# Patient Record
Sex: MALE | Race: WHITE | Employment: FULL TIME | URBAN - METROPOLITAN AREA
[De-identification: names, ages, dates, MRNs, and addresses within clinical notes are randomized per-mention and may not be internally consistent; named-entity substitution may affect disease eponyms.]

---

## 2019-04-24 ENCOUNTER — HOSPITAL ENCOUNTER (OUTPATIENT)
Dept: GENERAL RADIOLOGY | Age: 64
Discharge: HOME | End: 2019-04-24
Payer: COMMERCIAL

## 2019-04-24 DIAGNOSIS — M79.644 PAIN IN FINGER OF RIGHT HAND: ICD-10-CM

## 2019-04-24 DIAGNOSIS — M25.512 LEFT SHOULDER PAIN: ICD-10-CM

## 2019-04-24 DIAGNOSIS — M25.511 SHOULDER PAIN, RIGHT: ICD-10-CM

## 2019-04-24 PROCEDURE — 73030 X-RAY EXAM OF SHOULDER: CPT

## 2019-04-24 PROCEDURE — 73130 X-RAY EXAM OF HAND: CPT

## 2022-10-07 ENCOUNTER — OFFICE VISIT (OUTPATIENT)
Dept: NEUROLOGY | Age: 67
End: 2022-10-07
Payer: COMMERCIAL

## 2022-10-07 VITALS
OXYGEN SATURATION: 98 % | WEIGHT: 273 LBS | SYSTOLIC BLOOD PRESSURE: 130 MMHG | DIASTOLIC BLOOD PRESSURE: 78 MMHG | HEART RATE: 67 BPM

## 2022-10-07 DIAGNOSIS — M48.062 LUMBAR STENOSIS WITH NEUROGENIC CLAUDICATION: Primary | ICD-10-CM

## 2022-10-07 DIAGNOSIS — G50.0 TRIGEMINAL NEURALGIA OF LEFT SIDE OF FACE: ICD-10-CM

## 2022-10-07 PROCEDURE — 99205 OFFICE O/P NEW HI 60 MIN: CPT | Performed by: NURSE PRACTITIONER

## 2022-10-07 PROCEDURE — 1123F ACP DISCUSS/DSCN MKR DOCD: CPT | Performed by: NURSE PRACTITIONER

## 2022-10-07 NOTE — PROGRESS NOTES
Pt said HA have pretty much stopped  Cathin Look they come and go  Had a tooth extracted and made HA worse, pains in cheek and right above left eye, sinus area  Currently having them every now and then, couldn't verify how often    Sciatica nerve pain in right hip, knee, and down to shin, aching pains only when sitting   Pains are everyday  Standing makes pain go away

## 2022-10-10 NOTE — PROGRESS NOTES
Kirstin Bradford is a 69 y.o. male who presents with the following  Chief Complaint   Patient presents with    New Patient     HA and sciatica nerve pain       HPI    New patient for headaches and sciatica  His headaches started a few months ago after a tooth extraction on the left side  He discusses distribution on V2 V3 of the trigeminal nerve  The pain comes and goes in the head and face  Sharp, spasms  These are almost gone  He rarely has the symptoms anymore  This is what he made his original appointment for but he does have right leg weakness was paresthesia  He does have a history of sciatica and noticing his right leg is worse the last couple weeks  No falls tripping or trauma  He does have low back pain which radiates down the back of the hip down the back of it but and down the back of the leg  The pain can be intense and he has to sit or stand depending upon what he is doing  The more he is active the more it hurts  Nothing is really helped  He has not done physical therapy  He wants to hold off on any kind of meds for right now  It does bother him    Not on File    No current outpatient medications on file. No current facility-administered medications for this visit. Social History     Tobacco Use   Smoking Status Not on file   Smokeless Tobacco Not on file       No past medical history on file. No past surgical history on file. No family history on file. Social History     Socioeconomic History    Marital status:        ROS    Remainder of comprehensive review is negative. Physical Exam :    Visit Vitals  /78 (BP 1 Location: Left upper arm, BP Patient Position: Sitting, BP Cuff Size: Large adult)   Pulse 67   Wt 123.8 kg (273 lb)   SpO2 98%       General: Well defined, nourished, and groomed individual in no acute distress. Neck: Supple, nontender, no bruits, no pain with resistance to active range of motion.     Musculoskeletal: Extremities revealed no edema and had full range of motion of joints. Psych: Good mood and bright affect    NEUROLOGICAL EXAMINATION:    Mental Status: Alert and oriented to person, place, and time    Cranial Nerves:    II, III, IV, VI: Visual acuity grossly intact. Visual fields are normal.    Pupils are equal, round, and reactive to light and accommodation. Extra-ocular movements are full and fluid. Fundoscopic exam was benign, no ptosis or nystagmus. V-XII: Hearing is grossly intact. Facial features are symmetric, with normal sensation and strength. The palate rises symmetrically and the tongue protrudes midline. Sternocleidomastoids 5/5. Motor Examination: Normal tone, bulk, and strength, 5/5 muscle strength throughout. 3/5 RLE     Coordination: Finger to nose was normal. No resting or intention tremor    Gait and Station: Steady while walking. Normal arm swing. No pronator drift. No muscle wasting or fasiculations noted. Reflexes: DTRs 4+ throughout RLE, 2+ LLE     Neurosensory Exam:  Stocking glove sensory loss to temperature, vibration, and pinprick to the thighs. No results found for this or any previous visit. Orders Placed This Encounter    REFERRAL TO PHYSICAL THERAPY     Referral Priority:   Routine     Referral Type:   PT/OT/ST     Referral Reason:   Specialty Services Required     Number of Visits Requested:   1    EMG LIMITED     Standing Status:   Future     Standing Expiration Date:   4/7/2023     Order Specific Question:   Reason for Exam:     Answer:   right leg weakness, paresthesia       1. Lumbar stenosis with neurogenic claudication    2.  Trigeminal neuralgia of left side of face          Trigeminal neuralgia of the left side of the face post tooth work  Pretty much gone so we will continue to watch and wait    EMG of the right leg to look at sciatica causes  PT for the low back and leg to help with symptoms  Follow-up after            This note will [facility] be viewable in [facility]

## 2022-11-01 ENCOUNTER — OFFICE VISIT (OUTPATIENT)
Dept: NEUROLOGY | Age: 67
End: 2022-11-01

## 2022-11-01 DIAGNOSIS — M54.17 LUMBOSACRAL RADICULOPATHY: Primary | ICD-10-CM

## 2022-11-01 NOTE — PROGRESS NOTES
This was an elective EMG and nerve conduction of the patient's right leg by request.  Concerns went to stereotypical radicular type symptoms down the right leg on the anterior lateral aspect of the right lower leg and just above the ankle to the lateral malleolus. Positional in nature feels worse sitting and better standing. Does have background diabetes. Per nurse practitioner exam had moderate weakness right leg only with stocking sensory loss to the thighs and exam otherwise unrevealing. EMG and nerve conduction findings. 1.  Needle insertion and probing was remarkable for nondescript irritability of the right medial gastrocs and the right biceps femoris lateral head and the right L4-5 L5-S1 paraspinals. Otherwise no confident pattern of fibs or fasics or positive sharp waves were discernible. Motor unit recruitment in all muscles was appropriate as to morphology amplitude and time sequencing. Patient tolerated the procedure without incident. 2.  The nerve conduction portion showed absent sensory's and there was an absent right peroneal motor to the EDB but the EDB was an atretic muscle. Slightly decreased nerve conduction velocity with right tibial motor to the abductor hallucis brevis. H reflex normal.    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 No doubt an expression of his diabetes. The irritability in the framework of the right medial gastrocs and the biceps femoris lateral head and paraspinals, is nonspecific and otherwise has no direct correlations to a confident denervation pattern. Clinical correlation is advised.   [address]

## 2022-11-01 NOTE — PROGRESS NOTES
EMG/ NCS Report  [facility]  - [facility]  [facility]  [facility] [address]   [address]   Ph: [phone]   FAX: [phone]/ [phone]  Test Date:  2019      Test Date:  2022    Patient: Dino Mendieta : 1955 Physician: Sudeep Schroeder MD   Sex: Male Height: ' \" Ref Hung [place]   ID#: [id number] Weight:  lbs. Technician: Scott Rooney     Patient History / Exam:  CC:RT. Leg pain,weakness. EMG & NCV Findings:  Evaluation of the right Fibular motor nerve showed no response (Ankle), no response (B Fib), and no response (Poplt). The right Fibular TA motor nerve showed normal distal onset latency (Fib Head, 3.5 ms), normal amplitude (4.3 mV), normal distal onset latency (Poplit, 4.8 ms), and normal conduction velocity (Poplit-Fib Head, 77 m/s). The right tibial motor nerve showed normal distal onset latency (5.4 ms), normal amplitude (1.4 mV), and decreased conduction velocity (Knee-Ankle, 33 m/s). The right Sup Fibular sensory nerve showed no response (Lower leg) and no response (Site 2). The right sural sensory nerve showed no response (Calf) and no response (Site 2). F Wave studies indicate that the right tibial F wave has prolonged latency (63.39 ms). Needle evaluation of the right medial gastrocnemius muscle showed increased insertional activity. All remaining muscles (as indicated in the following table) showed no evidence of electrical instability.         Impression:        ___________________________  Juan M Cabrera IV, MD      Nerve Conduction Studies  Anti Sensory Summary Table     Stim Site NR Peak (ms) Norm Peak (ms) P-T Amp (µV) Norm P-T Amp Site1 Site2 Dist (cm)   Right Sup Fibular Anti Sensory (Lat ankle)  32.3 °C   Lower leg NR  <4.6  >4 Lower leg Lat ankle 10.0   Site 2 NR          Right Sural Anti Sensory (Lat Mall)  32.3 °C   Calf NR  <4.5  >4.0 Calf Lat Mall 14.0   Site 2 NR            Motor Summary Table     Stim Site NR Onset (ms) Norm Onset (ms) O-P Amp (mV) Norm O-P Amp Amp (Prev) (%) Site1 Site2 Dist (cm) Amor (m/s) Norm Amor (m/s)   Right Fibular Motor (Ext Dig Brev)  32.5 °C   Ankle NR  <6.5  >1.1  Ankle Ext Dig Brev 8.0     B Fib NR      B Fib Ankle 0.0  >38   Poplt NR      Poplt B Fib 10.0  >42   Right Fibular TA Motor (Tib Ant)  32.4 °C   Fib Head    3.5 <4.5 4.3 >3.0 100.0 Fib Head Tib Ant 10.0     Poplit    4.8 <5.7 3.9  90.7 Poplit Fib Head 10.0 77 >40   Right Tibial Motor (Abd Mcgill Brev)  32.4 °C   Ankle    5.4 <6.1 1.4 >1.1 100.0 Ankle Abd Mcgill Brev 8.0     Knee    18.4  0.7  50.0 Knee Ankle 43.0 33 >39     F Wave Studies     NR F-Lat (ms) Lat Norm (ms) L-R F-Lat (ms) L-R Lat Norm   Right Tibial (Mrkrs) (Abd Hallucis)  32.2 °C      63.39 <56  <5.7     H Reflex Studies     NR H-Lat (ms) L-R H-Lat (ms) L-R Lat Norm   Right Tibial (Gastroc)  32.2 °C      40.00  <2.0     EMG     Side Muscle Nerve Root Ins Act Fibs Psw Recrt Duration Amp Poly Comment   Right Ext Dig Brev Dp Br Peron L5, S1 Nml Nml Nml Nml Nml Nml Nml    Right AntTibialis Dp Br Peron L4-5 Nml Nml Nml Nml Nml Nml Nml    Right MedGastroc Tibial S1-2 Incr Nml Nml Nml Nml Nml Nml    Right VastusMed Femoral L2-4 Nml Nml Nml Nml Nml Nml Nml    Right BicepsFemL Sciatic L5-S2 Nml Nml Nml Nml Nml Nml Nml    Right Mid Lumb Parasp Rami L4,5 Nml Nml Nml Nml Nml Nml Nml    Right Lower Lumb Parasp Rami L5,S1 Nml Nml Nml Nml Nml Nml Nml                Nerve Conduction Studies  Anti Sensory Left/Right Comparison     Stim Site L Lat (ms) R Lat (ms) L-R Lat (ms) L Amp (µV) R Amp (µV) L-R Amp (%) Site1 Site2 L Amor (m/s) R Amor (m/s) L-R Amor (m/s)   Sup Fibular Anti Sensory (Lat ankle)  32.3 °C   Lower leg       Lower leg Lat ankle      Site 2              Sural Anti Sensory (Lat Mall)  32.3 °C   Calf       Calf Lat Mall      Site 2                Motor Left/Right Comparison     Stim Site L Lat (ms) R Lat (ms) L-R Lat (ms) L Amp (mV) R Amp (mV) L-R Amp (%) Site1 Site2 L Amor (m/s) R Amor (m/s) L-R Amor (m/s)   Fibular Motor (Ext Dig Brev)  32.5 °C   Ankle       Ankle Ext Dig Brev      B Fib       B Fib Ankle      Poplt       Poplt B Fib      Fibular TA Motor (Tib Ant)  32.4 °C   Fib Head  3.5   4.3  Fib Head Tib Ant      Poplit  4.8   3.9  Poplit Fib Head  77    Tibial Motor (Abd Mcgill Brev)  32.4 °C   Ankle  5.4   1.4  Ankle Abd Mcgill Brev      Knee  18.4   0.7  Knee Ankle  33          Waveforms:

## 2022-11-07 ENCOUNTER — TELEPHONE (OUTPATIENT)
Dept: NEUROLOGY | Age: 67
End: 2022-11-07

## 2022-11-11 ENCOUNTER — TELEPHONE (OUTPATIENT)
Dept: NEUROLOGY | Age: 67
End: 2022-11-11

## 2022-11-11 DIAGNOSIS — M54.17 LUMBOSACRAL RADICULOPATHY: Primary | ICD-10-CM

## 2022-11-11 NOTE — TELEPHONE ENCOUNTER
He calling about medication Gabapentin. He's calling about getting a prescription for the new dosage. Please advise.

## 2022-11-14 RX ORDER — GABAPENTIN 300 MG/1
300 CAPSULE ORAL 3 TIMES DAILY
Qty: 90 CAPSULE | Refills: 5 | Status: SHIPPED | OUTPATIENT
Start: 2022-11-14

## 2022-11-14 NOTE — TELEPHONE ENCOUNTER
Per previous note, rx for gabapentin approved but needed pharmacy. Pharmacy added today in demographics to be [facility] [address]. Please send rx.

## 2022-12-13 ENCOUNTER — OFFICE VISIT (OUTPATIENT)
Dept: NEUROLOGY | Age: 67
End: 2022-12-13
Payer: COMMERCIAL

## 2022-12-13 VITALS
DIASTOLIC BLOOD PRESSURE: 80 MMHG | SYSTOLIC BLOOD PRESSURE: 142 MMHG | RESPIRATION RATE: 18 BRPM | OXYGEN SATURATION: 97 % | HEART RATE: 71 BPM | WEIGHT: 270.6 LBS

## 2022-12-13 DIAGNOSIS — M79.604 RIGHT LEG PAIN: ICD-10-CM

## 2022-12-13 DIAGNOSIS — R94.131 ABNORMAL EMG: ICD-10-CM

## 2022-12-13 DIAGNOSIS — M54.17 LUMBOSACRAL RADICULOPATHY: Primary | ICD-10-CM

## 2022-12-13 DIAGNOSIS — M48.062 LUMBAR STENOSIS WITH NEUROGENIC CLAUDICATION: ICD-10-CM

## 2022-12-13 RX ORDER — DICLOFENAC SODIUM 75 MG/1
75 TABLET, DELAYED RELEASE ORAL 2 TIMES DAILY
Qty: 60 TABLET | Refills: 5 | Status: SHIPPED | OUTPATIENT
Start: 2022-12-13

## 2022-12-13 NOTE — PROGRESS NOTES
Chief Complaint   Patient presents with    Follow-up     Nerve pain         Visit Vitals  BP (!) 142/80   Pulse 71   Resp 18   Wt 270 lb 9.6 oz (122.7 kg)   SpO2 97%

## 2022-12-14 NOTE — PROGRESS NOTES
Remi Randle is a 69 y.o. male who presents with the following  Chief Complaint   Patient presents with    Follow-up     Nerve pain         HPI    FU EMG and PT   Tried Gabapentin and made him feel bad. Never used Diclofenac. Is stretching, exercising but still painful   Right side. He does have low back pain which radiates down the back of the hip down the back of it but and down the back of the leg  The pain can be intense and he has to sit or stand depending upon what he is doing  The more he is active the more it hurts  Nothing is really helped    It does bother him  Getting worse       Not on File    Current Outpatient Medications   Medication Sig    diclofenac EC (VOLTAREN) 75 mg EC tablet Take 1 Tablet by mouth two (2) times a day. No current facility-administered medications for this visit. Social History     Tobacco Use   Smoking Status Not on file   Smokeless Tobacco Not on file       History reviewed. No pertinent past medical history. No past surgical history on file. History reviewed. No pertinent family history. Social History     Socioeconomic History    Marital status:        Review of Systems   Constitutional:  Positive for malaise/fatigue. Negative for diaphoresis. Eyes:  Negative for blurred vision, double vision and photophobia. Musculoskeletal:  Positive for back pain. Negative for falls and joint pain. Neurological:  Positive for tingling, sensory change and weakness. Remainder of comprehensive review is negative. Physical Exam :    Visit Vitals  BP (!) 142/80   Pulse 71   Resp 18   Wt 122.7 kg (270 lb 9.6 oz)   SpO2 97%             No results found for this or any previous visit.     Orders Placed This Encounter    MRI LUMB SPINE W WO CONT     WILL NEED CONSCIOUS SEDATION     Standing Status:   Future     Standing Expiration Date:   1/13/2024     Scheduling Instructions:      WILL NEED CONSCIOUS SEDATION     Order Specific Question:   STAT Creatinine as indicated     Answer:   Yes    diclofenac EC (VOLTAREN) 75 mg EC tablet     Sig: Take 1 Tablet by mouth two (2) times a day. Dispense:  60 Tablet     Refill:  5       1. Lumbosacral radiculopathy    2. Lumbar stenosis with neurogenic claudication    3. Abnormal EMG    4. Right leg pain      Discussed symptoms and testing. Discussed PT failure   Stop gabapentin   Start Diclofenac for 75 mg BID   Continue to stretch, exercise. MRI lumbar spine to evaluate EMG finding further as lumbar radiculopathy  FU after.                  This note will not be viewable in [facility]